# Patient Record
Sex: MALE | Race: WHITE | NOT HISPANIC OR LATINO | Employment: OTHER | URBAN - METROPOLITAN AREA
[De-identification: names, ages, dates, MRNs, and addresses within clinical notes are randomized per-mention and may not be internally consistent; named-entity substitution may affect disease eponyms.]

---

## 2017-03-29 ENCOUNTER — HISTORICAL (OUTPATIENT)
Dept: LAB | Facility: HOSPITAL | Age: 57
End: 2017-03-29

## 2017-03-29 ENCOUNTER — HISTORICAL (OUTPATIENT)
Dept: PREADMISSION TESTING | Facility: HOSPITAL | Age: 57
End: 2017-03-29

## 2017-03-30 ENCOUNTER — HISTORICAL (OUTPATIENT)
Dept: SURGERY | Facility: HOSPITAL | Age: 57
End: 2017-03-30

## 2017-04-18 ENCOUNTER — HISTORICAL (OUTPATIENT)
Dept: SURGERY | Facility: HOSPITAL | Age: 57
End: 2017-04-18

## 2018-08-27 ENCOUNTER — TELEPHONE (OUTPATIENT)
Dept: SPINE | Facility: CLINIC | Age: 58
End: 2018-08-27

## 2018-08-27 NOTE — TELEPHONE ENCOUNTER
----- Message from Leilani Washington sent at 8/27/2018  8:45 AM CDT -----  Name of Who is Calling: Greg (Wife)      What is the request in detail: Pt would like to re-schedule the pts appt. Tried to schedule, re-schedule not available. in epic.       Can the clinic reply by [facility]:  No    What Number to Call Back if not in [facility]: [phone]

## 2018-08-27 NOTE — TELEPHONE ENCOUNTER
Called and spoke with wife and rescheduled patients appointment to the [facility] with Daxa Meyer.

## 2019-01-03 ENCOUNTER — TELEPHONE (OUTPATIENT)
Dept: NEUROSURGERY | Facility: CLINIC | Age: 59
End: 2019-01-03

## 2019-01-03 NOTE — TELEPHONE ENCOUNTER
----- Message from Asaf Ramsay sent at 1/2/2019  4:54 PM CST -----  Contact: Pt  Pt would like to be called back regarding rescheduling appt.    Pt can be reached at [phone].    Thank You.

## 2019-01-03 NOTE — TELEPHONE ENCOUNTER
----- Message from Britt Hayward sent at 1/3/2019 10:25 AM CST -----  Contact: pt's wife lucho [phone]            Name of Who is Calling: pt       What is the request in detail: returned the nurse's phone call. Call pt      Can the clinic reply by [facility]: no      What [facility] Back [facility]: pt's wife lucho [phone]

## 2019-01-03 NOTE — TELEPHONE ENCOUNTER
Called patient no answer, LVM with call back number requesting call back at earliest convenience.

## 2019-02-04 ENCOUNTER — OFFICE VISIT (OUTPATIENT)
Dept: NEUROSURGERY | Facility: CLINIC | Age: 59
End: 2019-02-04
Payer: MEDICAID

## 2019-02-04 VITALS
TEMPERATURE: 98 F | HEIGHT: 64 IN | WEIGHT: 223.75 LBS | DIASTOLIC BLOOD PRESSURE: 74 MMHG | BODY MASS INDEX: 38.2 KG/M2 | HEART RATE: 82 BPM | SYSTOLIC BLOOD PRESSURE: 128 MMHG

## 2019-02-04 DIAGNOSIS — M54.42 CHRONIC BILATERAL LOW BACK PAIN WITH LEFT-SIDED SCIATICA: Primary | ICD-10-CM

## 2019-02-04 DIAGNOSIS — G89.29 CHRONIC BILATERAL LOW BACK PAIN WITH LEFT-SIDED SCIATICA: Primary | ICD-10-CM

## 2019-02-04 PROBLEM — J45.40 MODERATE PERSISTENT ASTHMA WITHOUT COMPLICATION: Status: ACTIVE | Noted: 2017-06-28

## 2019-02-04 PROBLEM — E11.9 TYPE 2 DIABETES MELLITUS, WITHOUT LONG-TERM CURRENT USE OF INSULIN: Status: ACTIVE | Noted: 2018-07-30

## 2019-02-04 PROBLEM — G47.33 OSA ON CPAP: Status: ACTIVE | Noted: 2017-06-28

## 2019-02-04 PROBLEM — I25.10 CORONARY ARTERY DISEASE INVOLVING NATIVE CORONARY ARTERY OF NATIVE HEART: Status: ACTIVE | Noted: 2018-07-30

## 2019-02-04 PROBLEM — E03.9 ACQUIRED HYPOTHYROIDISM: Status: ACTIVE | Noted: 2018-07-30

## 2019-02-04 PROCEDURE — 99204 OFFICE O/P NEW MOD 45 MIN: CPT | Mod: S$PBB,,, | Performed by: PHYSICIAN ASSISTANT

## 2019-02-04 PROCEDURE — 99204 PR OFFICE/OUTPT VISIT, NEW, LEVL IV, 45-59 MIN: ICD-10-PCS | Mod: S$PBB,,, | Performed by: PHYSICIAN ASSISTANT

## 2019-02-04 PROCEDURE — 99999 PR PBB SHADOW E&M-EST. PATIENT-LVL IV: CPT | Mod: PBBFAC,,, | Performed by: PHYSICIAN ASSISTANT

## 2019-02-04 PROCEDURE — 99214 OFFICE O/P EST MOD 30 MIN: CPT | Mod: PBBFAC | Performed by: PHYSICIAN ASSISTANT

## 2019-02-04 PROCEDURE — 99999 PR PBB SHADOW E&M-EST. PATIENT-LVL IV: ICD-10-PCS | Mod: PBBFAC,,, | Performed by: PHYSICIAN ASSISTANT

## 2019-02-04 RX ORDER — BLOOD-GLUCOSE METER
EACH MISCELLANEOUS
COMMUNITY
Start: 2019-01-02

## 2019-02-04 RX ORDER — HUMAN INSULIN 100 [USP'U]/ML
INJECTION, SUSPENSION SUBCUTANEOUS
COMMUNITY
Start: 2018-12-12

## 2019-02-04 RX ORDER — GABAPENTIN 100 MG/1
CAPSULE ORAL
COMMUNITY
Start: 2018-11-29

## 2019-02-04 RX ORDER — DICLOFENAC SODIUM 75 MG/1
75 TABLET, DELAYED RELEASE ORAL 2 TIMES DAILY PRN
COMMUNITY

## 2019-02-04 RX ORDER — BUPROPION HYDROCHLORIDE 150 MG/1
150 TABLET ORAL
COMMUNITY

## 2019-02-04 RX ORDER — DULAGLUTIDE 0.75 MG/.5ML
INJECTION, SOLUTION SUBCUTANEOUS
COMMUNITY
Start: 2018-12-24

## 2019-02-04 RX ORDER — LEVOTHYROXINE SODIUM 150 UG/1
TABLET ORAL
COMMUNITY
Start: 2018-12-13

## 2019-02-04 RX ORDER — TAMSULOSIN HYDROCHLORIDE 0.4 MG/1
CAPSULE ORAL
COMMUNITY
Start: 2018-12-23

## 2019-02-04 RX ORDER — ATENOLOL 50 MG/1
TABLET ORAL
COMMUNITY
Start: 2018-12-23

## 2019-02-04 RX ORDER — FLUTICASONE PROPIONATE 50 MCG
2 SPRAY, SUSPENSION (ML) NASAL 2 TIMES DAILY PRN
COMMUNITY
Start: 2018-11-13

## 2019-02-04 RX ORDER — MONTELUKAST SODIUM 10 MG/1
10 TABLET ORAL
COMMUNITY

## 2019-02-04 RX ORDER — OMEPRAZOLE 40 MG/1
CAPSULE, DELAYED RELEASE ORAL
COMMUNITY
Start: 2018-12-23

## 2019-02-04 RX ORDER — BACLOFEN 20 MG/1
20 TABLET ORAL 2 TIMES DAILY PRN
COMMUNITY

## 2019-02-04 RX ORDER — HYDROCHLOROTHIAZIDE 12.5 MG/1
TABLET ORAL
COMMUNITY
Start: 2018-12-23

## 2019-02-04 RX ORDER — NAPROXEN SODIUM 220 MG/1
81 TABLET, FILM COATED ORAL
COMMUNITY

## 2019-02-04 RX ORDER — SIMVASTATIN 40 MG/1
TABLET, FILM COATED ORAL
COMMUNITY
Start: 2018-12-26

## 2019-02-04 RX ORDER — GLYBURIDE 5 MG/1
10 TABLET ORAL
COMMUNITY

## 2019-02-04 RX ORDER — METFORMIN HYDROCHLORIDE 1000 MG/1
TABLET ORAL
COMMUNITY
Start: 2018-12-26

## 2019-02-04 RX ORDER — IPRATROPIUM BROMIDE AND ALBUTEROL SULFATE 2.5; .5 MG/3ML; MG/3ML
3 SOLUTION RESPIRATORY (INHALATION) EVERY 4 HOURS PRN
COMMUNITY
Start: 2018-03-22

## 2019-02-04 RX ORDER — SYRING-NEEDL,DISP,INSUL,0.3 ML 31GX15/64"
SYRINGE, EMPTY DISPOSABLE MISCELLANEOUS
COMMUNITY
Start: 2018-12-30

## 2019-02-04 RX ORDER — OXYBUTYNIN CHLORIDE 5 MG/1
TABLET ORAL
COMMUNITY
Start: 2018-12-23

## 2019-02-04 NOTE — PROGRESS NOTES
[facility]  Neurosurgery    SUBJECTIVE:     History of Present Illness:  Joao Kenney is a 58 y.o. male with DM, hypothyroidism, CAD with h/o quadruple bypass, and asthma who presents with chronic LBP with left sided radiculopathy. He was referred to neurosurgery in April 2018, but due to difficulty commuting to [place] from his hometown, he presents new to neurosurgery today (February 2019). The pt states the pain began 2 years ago after falling in his bath tub. He endorses bilateral LBP that radiates to his LLE (left buttocks, down posterior thigh and leg, into bottom of left foot, does NOT extend into his toes). He has associated numbness in the same distribution as the pain when the pain is severe. Pain is 6/10 today, 0/10 at best when lying flat, and 10/10 at worst when lifting items and after prolonged walking. He endorses weakness in his BLE 2/2 pain. He can walk 100-200 feet before needing to rest for 5-10 minutes. Denies b/b dysfunction and saddle anesthesia.     He endorses right hand numbness that goes away with movement. Denies dropping items, neck pain, and gait disturbance.     He takes ASA81 daily. History of quadruple bypass surgery in 2008.       (Not in a hospital admission)    Review of patient's allergies indicates:  Allergies not on file    History reviewed. No pertinent past medical history.  History reviewed. No pertinent surgical history.  History reviewed. No pertinent family history.  Social History     Tobacco Use    Smoking status: Never Smoker    Smokeless tobacco: Never Used   Substance Use Topics    Alcohol use: No     Frequency: Never    Drug use: No        Review of Systems:  Constitutional: no fever or chills  Eyes: no visual changes  ENT: no nasal congestion or sore throat  Respiratory: no cough or shortness of breath  Cardiovascular: no chest pain or palpitations  Gastrointestinal: no nausea or vomiting, tolerating diet  Genitourinary: no hematuria or dysuria;  +OAB without incontinence   Integument/Breast: no rash or pruritis  Hematologic/Lymphatic: no easy bruising or lymphadenopathy  Musculoskeletal: +LBP and LLE pain as noted in HPI  Neurological: no seizures or tremors; +numbness in LLE  Behavioral/Psych: no auditory or visual hallucinations  Endocrine: no heat or cold intolerance    OBJECTIVE:     Vital Signs (Most Recent):  Temp: 98.4 °F (36.9 °C) (02/04/19 1015)  Pulse: 82 (02/04/19 1015)  BP: 128/74 (02/04/19 1015)    Physical Exam:  General: well developed, well nourished, no distress  Head: normocephalic, atraumatic  Neurologic: Alert and oriented. Thought content appropriate  GCS: Motor: 6/Verbal: 5/Eyes: 4 GCS Total: 15  Language: No aphasia  Speech: No dysarthria  Cranial nerves: face symmetric, tongue midline, CN II-XII grossly intact.   Eyes: pupils equal, round, reactive to light with accommodation, EOMI.   Pulmonary: normal respirations, not labored, no accessory muscles used    Sensory: intact to light touch throughout  Motor Strength: Moves all extremities spontaneously with good tone.  Full strength upper and lower extremities. No abnormal movements seen.     Strength  Deltoids Triceps Biceps Wrist Extension Wrist Flexion Hand    Upper: R 5/5 5/5 5/5 5/5 5/5 5/5    L 5/5 5/5 5/5 5/5 5/5 5/5     Iliopsoas Quadriceps Knee  Flexion Tibialis  anterior Gastro- cnemius EHL   Lower: R 5/5 5/5 5/5 5/5 5/5 5/5    L 5/5 5/5 5/5 5/5 5/5 5/5     DTR's - 2 + and symmetric brachioradialis, patellar, & achilles  Sue: absent  Clonus: absent  Babinski: down-sloping bilaterally   Pulses: 2+ and symmetric radial and dorsalis pedis  Skin: warm, dry and intact, no rashes  Straight leg raise: positive for low back pain with Left SLR  Gait: normal       Cervical ROM: full  Lumbar ROM: limited in flexion   Pain on Hip ROM: positive in all directions  VALENTIN: positive for LBP bilaterally     Diagnostic Results:  I have personally reviewed imaging and agree with the  findings.     Outside Lumbar CT 4/12/18 (to be scanned into chart)  L3-4: diffuse disc bulge indenting the thecal sac but causing no high-grade central stenosis  L4-5: concentric disc bulge with no high-grade central stenosis   -Overall mild spondylosis with no high-grade central stenosis or foraminal impingement.     ASSESSMENT/PLAN:     Joao Kenney is a 58 y.o. male who presents with chronic low back pain with LLE radiculopathy. He is neurologically intact on exam and lumbar CT from April 2018 showed no significant central or foraminal stenosis. However, he reports a 2 year history of moderate to severe low back pain with an apparent left S1 radiculopathy. He also reports symptoms of neurogenic claudication. Given impact to patient's daily activities and length of time since onset of sxs, I will order a lumbar MRI to evaluate for lumbar stenosis. Additionally, the patient has a significant cardiac history. I recommend he begin conservative treatment to include physical therapy. Will have patient return to clinic in 2 months or sooner if sxs fail to improve with PT and/or MRI warrants sooner FU.     Pt is aware of and in agreement with the following plan:  -External PT referral printed for patient to bring to location of his choice.   -MRI lumbar spine order printed for patient. He will mail disc to neurosurgery once completed.   -RTC in 2 months or sooner if warranted by imaging or symptoms fail to improve with PT      Please feel free to call with any further questions        Daxa Meyer PA-C  [facility]  Department of Neurosurgery  [phone]

## 2019-02-04 NOTE — PATIENT INSTRUCTIONS
-Referral for physical therapy printed. You may bring to this to the location of your choice.   -Lumbar MRI order printed. Please complete near your home and mail the imaging disc once completed. Will call to discuss results once received.     Return to clinic in 2 months for follow-up after MRI and PT

## 2019-02-06 ENCOUNTER — TELEPHONE (OUTPATIENT)
Dept: NEUROSURGERY | Facility: CLINIC | Age: 59
End: 2019-02-06

## 2019-02-06 NOTE — TELEPHONE ENCOUNTER
Returned call, patients spouse states they want to do MRI @ [facility] in [place], [place]. Orders updated and faxed to facility [phone].

## 2019-02-06 NOTE — TELEPHONE ENCOUNTER
----- Message from Yudith Centeno sent at 2/6/2019 12:44 PM CST -----  Contact: Greg/ Spouse/ [phone]  Greg would like staff to give her a call regarding patients referral for his MRI.  Thank you.

## 2019-02-15 ENCOUNTER — TELEPHONE (OUTPATIENT)
Dept: NEUROSURGERY | Facility: CLINIC | Age: 59
End: 2019-02-15

## 2019-02-15 NOTE — TELEPHONE ENCOUNTER
----- Message from Britt Hayward sent at 2/15/2019 10:10 AM CST -----  Contact: gemma with [facility] [phone]  Name of Who is Calling: gemma       What is the request in detail: the pt is having a mri on Monday and needs authorization. Please call gemma      Can the clinic reply by [facility]: no      What Number to Call Back [facility] not in [facility]: gemma with [facility] [phone]

## 2019-02-15 NOTE — TELEPHONE ENCOUNTER
Spoke to [facility] who is requesting auth for imaging scheduled Monday. Irlanda Merrill approval code sent back [2/15/2019 2:35 PM]  Marita Merrill:    [id number] valid 2/15-4/16/19.   Information called into [facility].

## 2019-03-15 ENCOUNTER — TELEPHONE (OUTPATIENT)
Dept: NEUROSURGERY | Facility: CLINIC | Age: 59
End: 2019-03-15

## 2019-03-15 NOTE — TELEPHONE ENCOUNTER
Called both home and cell and left v/m to return call to office regarding 4/5/19 appt. Pt's appt needs to be reschedule as the provider will not be in the office.

## 2019-03-20 ENCOUNTER — TELEPHONE (OUTPATIENT)
Dept: NEUROSURGERY | Facility: CLINIC | Age: 59
End: 2019-03-20

## 2019-03-20 NOTE — TELEPHONE ENCOUNTER
Spoke to patients wife, relayed message from provider. Wife verbalized understanding, states she will inform patient.

## 2019-03-20 NOTE — TELEPHONE ENCOUNTER
----- Message from Daxa Meyer PA-C sent at 3/20/2019  2:09 PM CDT -----  Please call patient regarding MRI results. From the report alone, it does not appear that he has any significant central or foraminal stenosis to explain his symptoms. I recommend he continue PT at this time. It is important that he bring the imaging disc to his FU with Dr. Banda so that we can look at the pictures ourselves.     Thanks,   Daxa       ----- Message -----  From: Daxa Meyer PA-C  Sent: 3/18/2019   4:30 PM  To: DARRIUS Gallegos LPN sent to Daxa Meyer PA-C  Caller: pt       Previous Messages      ----- Message -----   From: Britt Hayward   Sent: 3/18/2019  10:25 AM   To: Mitch Mittal Staff       Name of Who is Calling: pt       What is the request in detail: pt is requesting results from mri. Call pt       Can the clinic reply by [facility]: no       What Number [facility] Back [facility]: [phone]